# Patient Record
(demographics unavailable — no encounter records)

---

## 2025-06-24 NOTE — HISTORY OF PRESENT ILLNESS
[FreeTextEntry1] :  labs- A1C 5.9%, TG is elevated 287, LDL 57, HDL 41, Scr 1.0, uPCR 0.1 A1c higher all probably from dietary and less exercise.  /79 this am at home. Weight 175 Ibs. Started diet control with less carb, fat, walking a mile after work around the house. still no colonoscopy or Shingle vaccine no swelling or edema      no fever and no chills. Cardiovascular: the heart rate was not slow, no chest pain and no palpitations. Respiratory: no shortness of breath, no cough and no shortness of breath during exertion. Gastrointestinal: no abdominal pain, no vomiting and no diarrhea. Genitourinary: no dysuria, no urinary hesitancy and no nocturia. Integumentary: no skin lesions. Neurological: no confusion and no dizziness. Endocrine: no episodes of hypoglycemia Constitutional, Eyes, ENT, Cardiovascular, Respiratory, Gastrointestinal, Genitourinary, Neurological and Psychiatric are otherwise negative.

## 2025-06-24 NOTE — REASON FOR VISIT
[Follow-Up] : a follow-up visit [Home] : at home, [unfilled] , at the time of the visit. [Other Location: e.g. Home (Enter Location, City,State)___] : at [unfilled] [Telehealth (audio & video)] : This visit was provided via telehealth using real-time 2-way audio visual technology. [Verbal consent obtained from patient] : the patient, [unfilled]

## 2025-06-24 NOTE — ASSESSMENT
[FreeTextEntry1] : Pt. is a 58 y.o. M presenting for follow up for HTN and CKD. eGFR 79. Has history of DIAZ secondary to hypertensive urgency.    #HTN well controlled. #CKD2 stable.  #HLD controlled. on Statin  -HTN well controlled. no proteinuria. Amlodipine 5mg and Lisinopril 10mg BID  -No proteinuria or hematuria  -Cr stable, latest 1.01. Hx of DIAZ in the past in the setting of HTN urgency  -Cholesterol controlled better on Lipitor.  -continue to lose weight and exercise, discussed strategies and techniques.  - prediabetes A1c is 5.9. limit Carbs. less cereal and rice, more exercise   -TSH and PTH wnl. following with endocrine as well,  - avoid NSAIDs  -HCM flu vaccine and COVID UpToDate. -needs shingle vaccine again discussed and advised  -needs colonoscopy must be done. He was told again to schedule it . high priority  -CV evaluation. Dr. Rodriguez/Shreyas/may   RTC in 1 year.